# Patient Record
Sex: MALE | Race: WHITE | URBAN - METROPOLITAN AREA
[De-identification: names, ages, dates, MRNs, and addresses within clinical notes are randomized per-mention and may not be internally consistent; named-entity substitution may affect disease eponyms.]

---

## 2018-04-30 ENCOUNTER — TRANSFERRED RECORDS (OUTPATIENT)
Dept: HEALTH INFORMATION MANAGEMENT | Facility: CLINIC | Age: 43
End: 2018-04-30

## 2018-05-14 ENCOUNTER — PRE VISIT (OUTPATIENT)
Dept: ORTHOPEDICS | Facility: CLINIC | Age: 43
End: 2018-05-14

## 2018-05-14 NOTE — TELEPHONE ENCOUNTER
FUTURE VISIT INFORMATION      FUTURE VISIT INFORMATION:    Date: 5/21    Time: 8:10    [place] [facility] [place]  REFERRAL INFORMATION:    Referring provider:  Dr Krishnamurthy    Referring providers clinic:  [facility] [place]    Reason for visit/diagnosis  L rotator cuff tear    RECORDS REQUESTED FROM:       Clinic name Comments Records Status Imaging Status   [facility] [place] Requested records  In PACS                                   RECORDS STATUS

## 2018-05-18 ENCOUNTER — PRE VISIT (OUTPATIENT)
Dept: ORTHOPEDICS | Facility: CLINIC | Age: 43
End: 2018-05-18

## 2018-05-18 DIAGNOSIS — M25.512 LEFT SHOULDER PAIN: Primary | ICD-10-CM

## 2018-05-18 NOTE — TELEPHONE ENCOUNTER
Patient is being referred by Dr. Krishnamurthy for L rotator cuff tear.    Patient is new to this provider.  Patient has no outside records available at the time of chart prep. MRI from 04/30/18 from [facility] in PACS.    Patient is coming to clinic for initial consultation.      Per standing orders, Left shoulder [place] views have been ordered and scheduled.     Patient visit type and questionnaires have been reviewed and are correct for this appointment? Yes    Tiago MURPHY, CMA

## 2018-05-21 ENCOUNTER — RADIANT APPOINTMENT (OUTPATIENT)
Dept: GENERAL RADIOLOGY | Facility: CLINIC | Age: 43
End: 2018-05-21
Attending: ORTHOPAEDIC SURGERY
Payer: COMMERCIAL

## 2018-05-21 ENCOUNTER — OFFICE VISIT (OUTPATIENT)
Dept: ORTHOPEDICS | Facility: CLINIC | Age: 43
End: 2018-05-21
Payer: COMMERCIAL

## 2018-05-21 VITALS — HEIGHT: 68 IN | BODY MASS INDEX: 26.83 KG/M2 | WEIGHT: 177 LBS

## 2018-05-21 DIAGNOSIS — S46.012A TRAUMATIC ROTATOR CUFF TEAR, LEFT, INITIAL ENCOUNTER: Primary | ICD-10-CM

## 2018-05-21 DIAGNOSIS — M25.512 LEFT SHOULDER PAIN: ICD-10-CM
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
## 2018-05-21 NOTE — LETTER
Date:May 24, 2018      Patient was self referred, no letter generated. Do not send.        [facility] Health Information

## 2018-05-21 NOTE — MR AVS SNAPSHOT
"              After Visit Summary   2018    Phu Olson    MRN: [id number]           Patient Information     Date Of Birth          1975        Visit Information        Provider Department      2018 8:10 AM Kole Darby MD [facility]        Today's Diagnoses     Left shoulder pain    -  1       Follow-ups after your visit        Future tests that were ordered for you today     Open Future Orders        Priority Expected Expires Ordered    MR Shoulder Left w/o & w Contrast Routine 2018            Who to contact     Please call your clinic at [phone] to:    Ask questions about your health    Make or cancel appointments    Discuss your medicines    Learn about your test results    Speak to your doctor            Additional Information About Your Visit        MyChart Information     [employer] is an electronic gateway that provides easy, online access to your medical records. With [employer], you can request a clinic appointment, read your test results, renew a prescription or communicate with your care team.     To sign up for [employer] visit the website at [email or web address]   You will be asked to enter the access code listed below, as well as some personal information. Please follow the directions to create your username and password.     Your access code is: [email or web address]  Expires: 2018  6:30 AM     Your access code will  in 90 days. If you need help or a new code, please contact your [facility] or call [phone] for assistance.        Care EveryWhere ID     This is your Care EveryWhere ID. This could be used by other organizations to access your [place] medical records  [id number]        Your Vitals Were     Height BMI (Body Mass Index)                1.727 m (5' 8\") 26.91 kg/m2           Blood Pressure from Last 3 Encounters:   No data found for BP    Weight from Last 3 Encounters: "   05/21/18 80.3 kg (177 lb)               Primary Care Provider Fax #    Physician No Ref-Primary [phone]       No address on file        Equal Access to Services     DAVINA ROCKWELL : Hadii aad ku hadramónwilberto Annymatali, elsy lizbethmaude, pedro borrego, sintia dial robinjayme webster lamarcoschristopher curran. So [facility] [phone].    ATENCIÓN: Si habla español, tiene a cuba disposición servicios gratuitos de asistencia lingüística. Llame al [phone].    We comply with applicable federal civil rights laws and [place] laws. We do not discriminate on the basis of race, color, national origin, age, disability, sex, sexual orientation, or gender identity.            Thank you!     Thank you for choosing [facility]  for your care. Our goal is always to provide you with excellent care. Hearing back from our patients is one way we can continue to improve our services. Please take a few minutes to complete the written survey that you may receive in the mail after your visit with us. Thank you!             Your Updated Medication List - Protect others around you: Learn how to safely use, store and throw away your medicines at www.disposemymeds.org.          This list is accurate as of 5/21/18  9:43 AM.  Always use your most recent med list.                   Brand Name Dispense Instructions for use Diagnosis    FISH OIL PO           MULTI VITAMIN PO
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
## 2018-10-24 ENCOUNTER — TELEPHONE (OUTPATIENT)
Dept: ORTHOPEDICS | Facility: CLINIC | Age: 43
End: 2018-10-24

## 2018-10-24 DIAGNOSIS — Z53.9 ERRONEOUS ENCOUNTER--DISREGARD: Primary | ICD-10-CM

## 2018-11-07 ENCOUNTER — TELEPHONE (OUTPATIENT)
Dept: ORTHOPEDICS | Facility: CLINIC | Age: 43
End: 2018-11-07

## 2018-11-07 NOTE — TELEPHONE ENCOUNTER
Patient requests shoulder surgery as soon as possible.  He is on short term disability while recovering from surgery on his bilateral hand and wrist fractures 2 weeks ago.  The injuries occurred when he fell off a ladder while building a zipline for his daughter.  He plans to transition to removable splints by the beginning of December.    Dr Darby will be consulted next week concerning safe timing of the rotator cuff repair.  Patient is agreeable with this.
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
## 2018-11-15 ENCOUNTER — RADIANT APPOINTMENT (OUTPATIENT)
Dept: MRI IMAGING | Facility: CLINIC | Age: 43
End: 2018-11-15
Attending: ORTHOPAEDIC SURGERY
Payer: COMMERCIAL

## 2018-11-15 ENCOUNTER — TELEPHONE (OUTPATIENT)
Dept: ORTHOPEDICS | Facility: CLINIC | Age: 43
End: 2018-11-15

## 2018-11-15 DIAGNOSIS — M75.102 TEAR OF LEFT ROTATOR CUFF, UNSPECIFIED TEAR EXTENT: ICD-10-CM

## 2018-12-03 ENCOUNTER — OFFICE VISIT (OUTPATIENT)
Dept: ORTHOPEDICS | Facility: CLINIC | Age: 43
End: 2018-12-03
Payer: COMMERCIAL

## 2018-12-03 VITALS — HEIGHT: 68 IN | BODY MASS INDEX: 26.83 KG/M2 | WEIGHT: 177 LBS

## 2018-12-03 DIAGNOSIS — M75.122 COMPLETE TEAR OF LEFT ROTATOR CUFF: Primary | ICD-10-CM

## 2018-12-03 DIAGNOSIS — M75.22 BICEPS TENDINITIS OF LEFT UPPER EXTREMITY: ICD-10-CM

## 2018-12-03 NOTE — MR AVS SNAPSHOT
After Visit Summary   12/3/2018    Phu Olson    MRN: [id number]           Patient Information     Date Of Birth          1975        Visit Information        Provider Department      12/3/2018 1:20 PM Kole Darby MD [facility]        Today's Diagnoses     Complete tear of left rotator cuff    -  1    Biceps tendinitis of left upper extremity           Follow-ups after your visit        Follow-up notes from your care team     Return if symptoms worsen or fail to improve.      Your next 10 appointments already scheduled     Dec 19, 2018   Procedure with Kole Darby MD   [facility], [place], Same Day Surgery (--)    [address] [address] [id number] [phone]              Who to contact     Please call your clinic at [phone] to:    Ask questions about your health    Make or cancel appointments    Discuss your medicines    Learn about your test results    Speak to your doctor            Additional Information About Your Visit        MyChart Information     [employer] gives you secure access to your electronic health record. If you see a primary care provider, you can also send messages to your care team and make appointments. If you have questions, please call your primary care clinic.  If you do not have a primary care provider, please call [phone] and they will assist you.      [employer] is an electronic gateway that provides easy, online access to your medical records. With [employer], you can request a clinic appointment, read your test results, renew a prescription or communicate with your care team.     To access your existing account, please contact your [facility] or call [phone] for assistance.        Care EveryWhere ID     This is your Care EveryWhere ID. This could be used by other organizations to access your [place] medical records  [id number]        Your Vitals Were     Height BMI (Body Mass  "Index)                1.727 m (5' 8\") 26.91 kg/m2           Blood Pressure from Last 3 Encounters:   No data found for BP    Weight from Last 3 Encounters:   12/03/18 80.3 kg (177 lb)   05/21/18 80.3 kg (177 lb)              We Performed the Following     Deana-Operative Worksheet (Shoulder)        Primary Care Provider Fax #    Physician No Ref-Primary [phone]       No address on file        Equal Access to Services     TIP ROCKWELL : Hadii aad ku hadasho Soomaali, waaxda luqadaha, qaybta kaalmada adeegyada, waxay idiin hayaan adeeg shengadalimichael key . So [facility] [phone].    ATENCIÓN: Si habla espjohn, tiene a cuba disposición servicios gratuitos de asistencia lingüística. Llame al [phone].    We comply with applicable federal civil rights laws and [place] laws. We do not discriminate on the basis of race, color, national origin, age, disability, sex, sexual orientation, or gender identity.            Thank you!     Thank you for [facility]  for your care. Our goal is always to provide you with excellent care. Hearing back from our patients is one way we can continue to improve our services. Please take a few minutes to complete the written survey that you may receive in the mail after your visit with us. Thank you!             Your Updated Medication List - Protect others around you: Learn how to safely use, store and throw away your medicines at www.disposemymeds.org.          This list is accurate as of 12/3/18 11:59 PM.  Always use your most recent med list.                   Brand Name Dispense Instructions for use Diagnosis    FISH OIL PO           MULTI VITAMIN PO             "

## 2018-12-03 NOTE — NURSING NOTE
Teaching Flowsheet   Relevant Diagnosis: Rotator cuff tear  Teaching Topic: Pre-op for left ARCR, subacromial decompression, biceps tenodesis, distal clavicle excision - surgery coordinator will call to confirm 12/19/18 OR date at [facility]     Person(s) involved in teaching:   Patient     Motivation Level:  Asks Questions: Yes  Eager to Learn: Yes  Cooperative: Yes  Receptive (willing/able to accept information): Yes  Any cultural factors/Mu-ism beliefs that may influence understanding or compliance? No     Patient demonstrates understanding of the following:  Reason for the appointment, diagnosis and treatment plan: Yes  Knowledge of proper use of medications and conditions for which they are ordered (with special attention to potential side effects or drug interactions): Yes  Which situations necessitate calling provider and whom to contact: Yes     Teaching Concerns Addressed:   Pre-op H&P with primary provider  His mother will provide transportation after surgery  Aware of post-op expectations     Proper use and care of sling x 8 weeks (medical equip, care aids, etc.): Yes  Nutritional needs and diet plan: Yes  Pain management techniques: Yes  Wound Care: Yes  How and/when to access community resources: Yes     Instructional Materials Used/Given: Pre-op packet, surgical soap, written guidelines for care after rotator cuff repair     Time spent with patient: 12.

## 2018-12-03 NOTE — LETTER
Date:December 7, 2018      Patient was self referred, no letter generated. Do not send.        [facility] Information

## 2018-12-03 NOTE — LETTER
12/3/2018       RE: Phu Olson  [address] [address]     Dear Colleague,    Thank you for referring your patient, Phu Olson, to the [facility] at [facility]. Please see a copy of my visit note below.    CHIEF CONCERN: Left shoulder pain    HISTORY:   3-year-old right-hand-dominant male  returns to clinic for repeated evaluation of left shoulder pain.  He was last seen in our clinic in May 21 of 2018 where he was noted to have a rotator cuff tear and proceed with nonoperative management.  In the interim since his last visit, he is undergone physical therapy as well as a repeated MRI which demonstrated progression of his tear.  Therefore, surgical intervention was discussed.  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  He states since then, he underwent open reduction internal fixation of the bilateral wrists by Helene Gauger approximately 5 weeks ago, and has been healing nicely since then.  He is noted worsening of his diffuse left shoulder ache since the fall, and wonders whether there is a difference between his MRI in April and that in November.  He describes a 1/10 ache diffusely throughout his left shoulder, mostly localized anterolaterally but spreads diffusely throughout.  He is uncertain whether or not this is similar pain to prior to the fall.  Otherwise, he has no concerns.  He states he would like to undergo surgery fairly quickly as he is almost within 1 year of the work related incident that caused his left shoulder pain initially.  denies new numbness/tingling/weakness, denies fevers, chills, sob, cp.      PAST SURGICAL HISTORY: (Reviewed with the patient and in the medical record)  1. Right RCR   2. Revision right RCR  3. Heart ablation for WPW - resolved  4. Right hand surgery    EXAM:     General: Pleasant,  well-developed, male   Affect: Articulates and communicates with a normal and congruent affect  Pulm: Non-labored breathing at rest  Vascular: Left hand is warm and well-perfused  Eyes: Extra-ocular movements are intact   Lymphatics: No swelling or palpable lymphadenopathy in the shoulder and arm region.  Neuro: Sensation is grossly intact to light touch in the Ax/MCN/M/R/U nerve distributions.  Integumentary: Normal hair and sweat patterns without evidence of skin breakdown         Left upper extremity:    SANE score: 85    Skin is intact without lacerations, abrasions, or previous surgical incision    Tender over bceps, otherwise, Nontender to palpation over the AC joint, subacromial space, posterior shoulder, or scapular spine.    Range of Motion:     Forward Flexion: 170  of active forward flexion    External Rotation: 50  of active external rotation without leg    Internal Rotation: Internal rotation to L1 with intact liftoff    Negative speed and Yergason's provocative signs    Mild tenderness to palpation over the bicipital groove    Positive Hamilton's maneuver for anterior shoulder pain which improves with pronation    Minimal discomfort with resisted forward flexion in the scapular plane as well as resisted external rotation in adduction      IMAGING:    Left shoulder radiographs obtained in clinic today demonstrate no acute osseous abnormality or evidence of glenohumeral degenerative joint disease.    Left shoulder MRI obtained November 15 is compared to that from April 30, does demonstrate tearing, high-grade, the supraspinatus at the margin of the supra and infraspinatus, articular sided, also partial tearing of the subscapularis, there is biceps tendinopathy with flattening of the biceps tendon, increased signal as well as increased fluid surrounding, advanced AC joint arthrosis, no areas of bony edema    ASSESSMENT:  1. Full-thickness supraspinatus rotator cuff tear  2. 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  At present, his pain is worsening despite activity avoidance of nonsteroidal anti-inflammatories.  His repeat MRI does show a full-thickness tear of his supraspinatus as well as biceps tendinitis.  Therefore, as he has not had improvement with nonoperative management, we discussed operative management in the way of arthroscopic rotator cuff repair, subacromial decompression, distal clavicle excision, and biceps tenodesis.  We discussed risks benefits and alternatives.  We discussed risks including risk of death, stroke, blindness, permanent neurovascular injury, infection, continued pain, need for repeat surgery.  We discussed the postoperative course and 6 weeks of sling wear.  The patient was given opportunity to ask questions and wished to proceed.   Otherwise, he can continue activity and weightbearing as per Dr. Crandall restrictions given his recent bilateral open reduction internal fixation of the distal radius, and follow-up on an as-needed basis prior to surgery.      Isaiah Dietz MD MS  Orthopedic Surgery, PGY5    Seen and discussed with Dr. Darby who agrees      I saw the patient with the resident.  I agree with the resident note and plan of care.      Kole Darby MD      Again, thank you for allowing me to participate in the care of your patient.      Sincerely,    Kole Darby MD

## 2018-12-03 NOTE — PROGRESS NOTES
CHIEF CONCERN: Left shoulder pain    HISTORY:   3-year-old right-hand-dominant male  returns to clinic for repeated evaluation of left shoulder pain.  He was last seen in our clinic in May 21 of 2018 where he was noted to have a rotator cuff tear and proceed with nonoperative management.  In the interim since his last visit, he is undergone physical therapy as well as a repeated MRI which demonstrated progression of his tear.  Therefore, surgical intervention was discussed.  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  He states since then, he underwent open reduction internal fixation of the bilateral wrists by Helene Zhu approximately 5 weeks ago, and has been healing nicely since then.  He is noted worsening of his diffuse left shoulder ache since the fall, and wonders whether there is a difference between his MRI in April and that in November.  He describes a 1/10 ache diffusely throughout his left shoulder, mostly localized anterolaterally but spreads diffusely throughout.  He is uncertain whether or not this is similar pain to prior to the fall.  Otherwise, he has no concerns.  He states he would like to undergo surgery fairly quickly as he is almost within 1 year of the work related incident that caused his left shoulder pain initially.  denies new numbness/tingling/weakness, denies fevers, chills, sob, cp.      PAST SURGICAL HISTORY: (Reviewed with the patient and in the medical record)  1. Right RCR   2. Revision right RCR  3. Heart ablation for WPW - resolved  4. Right hand surgery    EXAM:     General: Pleasant, well-developed, male   Affect: Articulates and communicates with a normal and congruent affect  Pulm: Non-labored breathing at rest  Vascular: Left hand is warm and well-perfused  Eyes: Extra-ocular movements are intact   Lymphatics: No swelling or palpable lymphadenopathy in the shoulder  and arm region.  Neuro: Sensation is grossly intact to light touch in the Ax/MCN/M/R/U nerve distributions.  Integumentary: Normal hair and sweat patterns without evidence of skin breakdown         Left upper extremity:    SANE score: 85    Skin is intact without lacerations, abrasions, or previous surgical incision    Tender over bceps, otherwise, Nontender to palpation over the AC joint, subacromial space, posterior shoulder, or scapular spine.    Range of Motion:     Forward Flexion: 170  of active forward flexion    External Rotation: 50  of active external rotation without leg    Internal Rotation: Internal rotation to L1 with intact liftoff    Negative speed and Yergason's provocative signs    Mild tenderness to palpation over the bicipital groove    Positive [place]'s maneuver for anterior shoulder pain which improves with pronation    Minimal discomfort with resisted forward flexion in the scapular plane as well as resisted external rotation in adduction      IMAGING:    Left shoulder radiographs obtained in clinic today demonstrate no acute osseous abnormality or evidence of glenohumeral degenerative joint disease.    Left shoulder MRI obtained November 15 is compared to that from April 30, does demonstrate tearing, high-grade, the supraspinatus at the margin of the supra and infraspinatus, articular sided, also partial tearing of the subscapularis, there is biceps tendinopathy with flattening of the biceps tendon, increased signal as well as increased fluid surrounding, advanced AC joint arthrosis, no areas of bony edema    ASSESSMENT:  1. Full-thickness supraspinatus rotator cuff tear  2. Clinical and radiographic evidence of long head biceps pathology    PLAN:  Had a very nice discussion with this patient today regarding his left shoulder.  At present, his pain is worsening despite activity avoidance of nonsteroidal anti-inflammatories.  His repeat MRI does show a full-thickness tear of his supraspinatus  as well as biceps tendinitis.  Therefore, as he has not had improvement with nonoperative management, we discussed operative management in the way of arthroscopic rotator cuff repair, subacromial decompression, distal clavicle excision, and biceps tenodesis.  We discussed risks benefits and alternatives.  We discussed risks including risk of death, stroke, blindness, permanent neurovascular injury, infection, continued pain, need for repeat surgery.  We discussed the postoperative course and 6 weeks of sling wear.  The patient was given opportunity to ask questions and wished to proceed.   Otherwise, he can continue activity and weightbearing as per Dr. Crandall restrictions given his recent bilateral open reduction internal fixation of the distal radius, and follow-up on an as-needed basis prior to surgery.      Isaiah Dietz MD MS  Orthopedic Surgery, PGY5    Seen and discussed with Dr. Darby who agrees      I saw the patient with the resident.  I agree with the resident note and plan of care.      Kole Darby MD
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
## 2018-12-18 ENCOUNTER — TELEPHONE (OUTPATIENT)
Dept: ORTHOPEDICS | Facility: CLINIC | Age: 43
End: 2018-12-18

## 2018-12-18 ENCOUNTER — ANESTHESIA EVENT (OUTPATIENT)
Dept: SURGERY | Facility: CLINIC | Age: 43
End: 2018-12-18
Payer: COMMERCIAL

## 2018-12-18 ASSESSMENT — LIFESTYLE VARIABLES: TOBACCO_USE: 0

## 2018-12-18 NOTE — TELEPHONE ENCOUNTER
[facility] Call Center    Phone Message    May a detailed message be left on voicemail: yes    Reason for Call: Other: Patient is calling with questions regarding surgery 12/19/2018 with Dr. Darby. Please have nurse call the patient back to discuss. Thank you.     Action Taken: Message routed to:  [facility] ([facility] Ortho

## 2018-12-18 NOTE — TELEPHONE ENCOUNTER
Patient contacted.  I went over the surgery worksheet and clinic note with him.  I could not answer his questions.  I spoke with Renata in surgery scheduling.  She contacted dr. Mehnaz SHAIKH and will ask Dr. Darby to call him.

## 2018-12-19 ENCOUNTER — ANESTHESIA (OUTPATIENT)
Dept: SURGERY | Facility: CLINIC | Age: 43
End: 2018-12-19
Payer: COMMERCIAL

## 2018-12-19 ENCOUNTER — HOSPITAL ENCOUNTER (OUTPATIENT)
Facility: CLINIC | Age: 43
Discharge: HOME | End: 2018-12-19
Attending: ORTHOPAEDIC SURGERY | Admitting: ORTHOPAEDIC SURGERY
Payer: COMMERCIAL

## 2018-12-19 VITALS
HEART RATE: 71 BPM | BODY MASS INDEX: 26.93 KG/M2 | OXYGEN SATURATION: 95 % | WEIGHT: 177.69 LBS | RESPIRATION RATE: 16 BRPM | SYSTOLIC BLOOD PRESSURE: 110 MMHG | DIASTOLIC BLOOD PRESSURE: 60 MMHG | HEIGHT: 68 IN | TEMPERATURE: 98.6 F

## 2018-12-19 DIAGNOSIS — M25.519 CHRONIC SHOULDER PAIN, UNSPECIFIED LATERALITY: Primary | ICD-10-CM

## 2018-12-19 DIAGNOSIS — G89.29 CHRONIC SHOULDER PAIN, UNSPECIFIED LATERALITY: Primary | ICD-10-CM

## 2018-12-19 LAB — GLUCOSE BLDC GLUCOMTR-MCNC: 99 MG/DL (ref 70–99)

## 2018-12-19 PROCEDURE — 71000014 ZZH RECOVERY PHASE 1 LEVEL 2 FIRST HR: Performed by: ORTHOPAEDIC SURGERY

## 2018-12-19 PROCEDURE — 25000128 H RX IP 250 OP 636: Performed by: NURSE ANESTHETIST, CERTIFIED REGISTERED

## 2018-12-19 PROCEDURE — C9290 INJ, BUPIVACAINE LIPOSOME: HCPCS | Performed by: ANESTHESIOLOGY

## 2018-12-19 PROCEDURE — 37000009 ZZH ANESTHESIA TECHNICAL FEE, EACH ADDTL 15 MIN: Performed by: ORTHOPAEDIC SURGERY

## 2018-12-19 PROCEDURE — 36000057 ZZH SURGERY LEVEL 3 1ST 30 MIN - UMMC: Performed by: ORTHOPAEDIC SURGERY

## 2018-12-19 PROCEDURE — 25000128 H RX IP 250 OP 636: Performed by: ORTHOPAEDIC SURGERY

## 2018-12-19 PROCEDURE — 25000125 ZZHC RX 250: Performed by: ORTHOPAEDIC SURGERY

## 2018-12-19 PROCEDURE — 25000128 H RX IP 250 OP 636: Performed by: ANESTHESIOLOGY

## 2018-12-19 PROCEDURE — 27110028 ZZH OR GENERAL SUPPLY NON-STERILE: Performed by: ORTHOPAEDIC SURGERY

## 2018-12-19 PROCEDURE — 36000059 ZZH SURGERY LEVEL 3 EA 15 ADDTL MIN UMMC: Performed by: ORTHOPAEDIC SURGERY

## 2018-12-19 PROCEDURE — 82962 GLUCOSE BLOOD TEST: CPT

## 2018-12-19 PROCEDURE — C1713 ANCHOR/SCREW BN/BN,TIS/BN: HCPCS | Performed by: ORTHOPAEDIC SURGERY

## 2018-12-19 PROCEDURE — 25000132 ZZH RX MED GY IP 250 OP 250 PS 637: Performed by: ANESTHESIOLOGY

## 2018-12-19 PROCEDURE — 40000170 ZZH STATISTIC PRE-PROCEDURE ASSESSMENT II: Performed by: ORTHOPAEDIC SURGERY

## 2018-12-19 PROCEDURE — 71000027 ZZH RECOVERY PHASE 2 EACH 15 MINS: Performed by: ORTHOPAEDIC SURGERY

## 2018-12-19 PROCEDURE — 37000008 ZZH ANESTHESIA TECHNICAL FEE, 1ST 30 MIN: Performed by: ORTHOPAEDIC SURGERY

## 2018-12-19 PROCEDURE — 25000566 ZZH SEVOFLURANE, EA 15 MIN: Performed by: ORTHOPAEDIC SURGERY

## 2018-12-19 PROCEDURE — 27210794 ZZH OR GENERAL SUPPLY STERILE: Performed by: ORTHOPAEDIC SURGERY

## 2018-12-19 PROCEDURE — 25000125 ZZHC RX 250: Performed by: NURSE ANESTHETIST, CERTIFIED REGISTERED

## 2018-12-19 PROCEDURE — 71000015 ZZH RECOVERY PHASE 1 LEVEL 2 EA ADDTL HR: Performed by: ORTHOPAEDIC SURGERY

## 2018-12-19 DEVICE — IMP ANCHOR ARTHREX 5.5MM BIOCOMP CRKSCR TIGRTL AR-1927BCFT: Type: IMPLANTABLE DEVICE | Site: SHOULDER | Status: FUNCTIONAL

## 2018-12-19 DEVICE — IMP ANCHOR ARTHREX BC SWVLK TENODESIS 6.25X19.1MM AR-1662BC: Type: IMPLANTABLE DEVICE | Site: SHOULDER | Status: FUNCTIONAL

## 2018-12-19 RX ORDER — SODIUM CHLORIDE, SODIUM LACTATE, POTASSIUM CHLORIDE, CALCIUM CHLORIDE 600; 310; 30; 20 MG/100ML; MG/100ML; MG/100ML; MG/100ML
INJECTION, SOLUTION INTRAVENOUS CONTINUOUS
Status: DISCONTINUED | OUTPATIENT
Start: 2018-12-19 | End: 2018-12-19 | Stop reason: HOSPADM

## 2018-12-19 RX ORDER — ONDANSETRON 4 MG/1
4 TABLET, ORALLY DISINTEGRATING ORAL EVERY 30 MIN PRN
Status: DISCONTINUED | OUTPATIENT
Start: 2018-12-19 | End: 2018-12-19 | Stop reason: HOSPADM

## 2018-12-19 RX ORDER — PROPOFOL 10 MG/ML
INJECTION, EMULSION INTRAVENOUS CONTINUOUS PRN
Status: DISCONTINUED | OUTPATIENT
Start: 2018-12-19 | End: 2018-12-19

## 2018-12-19 RX ORDER — HYDROXYZINE PAMOATE 25 MG/1
25 CAPSULE ORAL 3 TIMES DAILY PRN
Qty: 30 CAPSULE | Refills: 0 | Status: SHIPPED | OUTPATIENT
Start: 2018-12-19 | End: 2019-01-18

## 2018-12-19 RX ORDER — FENTANYL CITRATE 50 UG/ML
25-50 INJECTION, SOLUTION INTRAMUSCULAR; INTRAVENOUS
Status: DISCONTINUED | OUTPATIENT
Start: 2018-12-19 | End: 2018-12-19 | Stop reason: HOSPADM

## 2018-12-19 RX ORDER — KETAMINE HYDROCHLORIDE 10 MG/ML
INJECTION, SOLUTION INTRAMUSCULAR; INTRAVENOUS PRN
Status: DISCONTINUED | OUTPATIENT
Start: 2018-12-19 | End: 2018-12-19

## 2018-12-19 RX ORDER — CEFAZOLIN SODIUM 2 G/100ML
2 INJECTION, SOLUTION INTRAVENOUS
Status: COMPLETED | OUTPATIENT
Start: 2018-12-19 | End: 2018-12-19

## 2018-12-19 RX ORDER — GABAPENTIN 300 MG/1
300 CAPSULE ORAL 3 TIMES DAILY
Qty: 90 CAPSULE | Refills: 0 | Status: SHIPPED | OUTPATIENT
Start: 2018-12-19 | End: 2019-01-18

## 2018-12-19 RX ORDER — CEFAZOLIN SODIUM 1 G/3ML
1 INJECTION, POWDER, FOR SOLUTION INTRAMUSCULAR; INTRAVENOUS SEE ADMIN INSTRUCTIONS
Status: DISCONTINUED | OUTPATIENT
Start: 2018-12-19 | End: 2018-12-19 | Stop reason: HOSPADM

## 2018-12-19 RX ORDER — PROPOFOL 10 MG/ML
INJECTION, EMULSION INTRAVENOUS PRN
Status: DISCONTINUED | OUTPATIENT
Start: 2018-12-19 | End: 2018-12-19

## 2018-12-19 RX ORDER — FENTANYL CITRATE 50 UG/ML
100 INJECTION, SOLUTION INTRAMUSCULAR; INTRAVENOUS ONCE
Status: DISCONTINUED | OUTPATIENT
Start: 2018-12-19 | End: 2018-12-19 | Stop reason: HOSPADM

## 2018-12-19 RX ORDER — NALOXONE HYDROCHLORIDE 0.4 MG/ML
.1-.4 INJECTION, SOLUTION INTRAMUSCULAR; INTRAVENOUS; SUBCUTANEOUS
Status: DISCONTINUED | OUTPATIENT
Start: 2018-12-19 | End: 2018-12-19 | Stop reason: HOSPADM

## 2018-12-19 RX ORDER — IBUPROFEN 800 MG/1
800 TABLET, FILM COATED ORAL
Qty: 90 TABLET | Refills: 0 | Status: SHIPPED | OUTPATIENT
Start: 2018-12-19 | End: 2019-01-18

## 2018-12-19 RX ORDER — HYDROMORPHONE HYDROCHLORIDE 1 MG/ML
.3-.5 INJECTION, SOLUTION INTRAMUSCULAR; INTRAVENOUS; SUBCUTANEOUS EVERY 10 MIN PRN
Status: DISCONTINUED | OUTPATIENT
Start: 2018-12-19 | End: 2018-12-19 | Stop reason: HOSPADM

## 2018-12-19 RX ORDER — ONDANSETRON 2 MG/ML
INJECTION INTRAMUSCULAR; INTRAVENOUS PRN
Status: DISCONTINUED | OUTPATIENT
Start: 2018-12-19 | End: 2018-12-19

## 2018-12-19 RX ORDER — ONDANSETRON 2 MG/ML
4 INJECTION INTRAMUSCULAR; INTRAVENOUS EVERY 30 MIN PRN
Status: DISCONTINUED | OUTPATIENT
Start: 2018-12-19 | End: 2018-12-19 | Stop reason: HOSPADM

## 2018-12-19 RX ORDER — GABAPENTIN 100 MG/1
300 CAPSULE ORAL
Status: DISCONTINUED | OUTPATIENT
Start: 2018-12-19 | End: 2018-12-19 | Stop reason: HOSPADM

## 2018-12-19 RX ORDER — OXYCODONE HYDROCHLORIDE 5 MG/1
5 TABLET ORAL EVERY 6 HOURS PRN
Qty: 40 TABLET | Refills: 0 | Status: SHIPPED | OUTPATIENT
Start: 2018-12-19 | End: 2019-01-18

## 2018-12-19 RX ORDER — SODIUM CHLORIDE, SODIUM LACTATE, POTASSIUM CHLORIDE, CALCIUM CHLORIDE 600; 310; 30; 20 MG/100ML; MG/100ML; MG/100ML; MG/100ML
INJECTION, SOLUTION INTRAVENOUS CONTINUOUS PRN
Status: DISCONTINUED | OUTPATIENT
Start: 2018-12-19 | End: 2018-12-19

## 2018-12-19 RX ORDER — FENTANYL CITRATE 50 UG/ML
INJECTION, SOLUTION INTRAMUSCULAR; INTRAVENOUS PRN
Status: DISCONTINUED | OUTPATIENT
Start: 2018-12-19 | End: 2018-12-19

## 2018-12-19 RX ORDER — ACETAMINOPHEN 325 MG/1
975 TABLET ORAL ONCE
Status: COMPLETED | OUTPATIENT
Start: 2018-12-19 | End: 2018-12-19

## 2018-12-19 RX ORDER — DEXAMETHASONE SODIUM PHOSPHATE 4 MG/ML
INJECTION, SOLUTION INTRA-ARTICULAR; INTRALESIONAL; INTRAMUSCULAR; INTRAVENOUS; SOFT TISSUE PRN
Status: DISCONTINUED | OUTPATIENT
Start: 2018-12-19 | End: 2018-12-19

## 2018-12-19 RX ORDER — BUPIVACAINE HYDROCHLORIDE 5 MG/ML
INJECTION, SOLUTION EPIDURAL; INTRACAUDAL PRN
Status: DISCONTINUED | OUTPATIENT
Start: 2018-12-19 | End: 2018-12-19

## 2018-12-19 RX ORDER — GABAPENTIN 100 MG/1
300 CAPSULE ORAL ONCE
Status: DISCONTINUED | OUTPATIENT
Start: 2018-12-19 | End: 2018-12-19 | Stop reason: HOSPADM

## 2018-12-19 RX ORDER — ACETAMINOPHEN 325 MG/1
975 TABLET ORAL ONCE
Status: DISCONTINUED | OUTPATIENT
Start: 2018-12-19 | End: 2018-12-19 | Stop reason: HOSPADM

## 2018-12-19 RX ORDER — FLUMAZENIL 0.1 MG/ML
0.2 INJECTION, SOLUTION INTRAVENOUS
Status: DISCONTINUED | OUTPATIENT
Start: 2018-12-19 | End: 2018-12-19 | Stop reason: HOSPADM

## 2018-12-19 RX ADMIN — FENTANYL CITRATE 50 MCG: 50 INJECTION, SOLUTION INTRAMUSCULAR; INTRAVENOUS at 10:45

## 2018-12-19 RX ADMIN — ONDANSETRON 4 MG: 2 INJECTION INTRAMUSCULAR; INTRAVENOUS at 11:09

## 2018-12-19 RX ADMIN — BUPIVACAINE 10 ML: 13.3 INJECTION, SUSPENSION, LIPOSOMAL INFILTRATION at 08:50

## 2018-12-19 RX ADMIN — MIDAZOLAM HYDROCHLORIDE 2 MG: 1 INJECTION, SOLUTION INTRAMUSCULAR; INTRAVENOUS at 07:56

## 2018-12-19 RX ADMIN — SODIUM CHLORIDE, POTASSIUM CHLORIDE, SODIUM LACTATE AND CALCIUM CHLORIDE: 600; 310; 30; 20 INJECTION, SOLUTION INTRAVENOUS at 09:16

## 2018-12-19 RX ADMIN — CEFAZOLIN SODIUM 2 G: 2 INJECTION, SOLUTION INTRAVENOUS at 09:37

## 2018-12-19 RX ADMIN — PROPOFOL 175 MCG/KG/MIN: 10 INJECTION, EMULSION INTRAVENOUS at 09:28

## 2018-12-19 RX ADMIN — ACETAMINOPHEN 975 MG: 325 TABLET, FILM COATED ORAL at 07:50

## 2018-12-19 RX ADMIN — PHENYLEPHRINE HYDROCHLORIDE 0.3 MCG/KG/MIN: 10 INJECTION, SOLUTION INTRAMUSCULAR; INTRAVENOUS; SUBCUTANEOUS at 09:37

## 2018-12-19 RX ADMIN — PROPOFOL 200 MG: 10 INJECTION, EMULSION INTRAVENOUS at 09:20

## 2018-12-19 RX ADMIN — KETAMINE HYDROCHLORIDE 20 MG: 10 INJECTION, SOLUTION INTRAMUSCULAR; INTRAVENOUS at 09:20

## 2018-12-19 RX ADMIN — BUPIVACAINE HYDROCHLORIDE 10 ML: 5 INJECTION, SOLUTION EPIDURAL; INTRACAUDAL at 08:50

## 2018-12-19 RX ADMIN — DEXAMETHASONE SODIUM PHOSPHATE 10 MG: 4 INJECTION, SOLUTION INTRAMUSCULAR; INTRAVENOUS at 09:24

## 2018-12-19 ASSESSMENT — MIFFLIN-ST. JEOR: SCORE: 1675.37

## 2018-12-19 NOTE — BRIEF OP NOTE
[facility], [place]    Brief Operative Note    Pre-operative diagnosis: Rotator Cuff Tear   Post-operative diagnosis * No post-op diagnosis entered *  Procedure: Procedure(s):  Left Shoulder Arthroscopic Rotator Cuff Repair, Arthroscopic Subacromial Decompression  Arthroscopic Biceps Tenodesis Repair, Arthroscopic Distal Clavical Excision  Surgeon: Surgeon(s) and Role:     * Kole Darby MD - Primary     * Dano Narayanan PA-C - Assisting     * Mariam Sarah MD - Resident - Assisting  Anesthesia: Other   Estimated blood loss: Minimal  Drains: None  Specimens: * No specimens in log *  Findings:   None.  Complications: None.  Implants: None.

## 2018-12-19 NOTE — ANESTHESIA POSTPROCEDURE EVALUATION
Anesthesia POST Procedure Evaluation    Patient: Phu Olson   MRN:     [id number] Gender:   male   Age:    43 year old :      1975        Preoperative Diagnosis: Rotator Cuff Tear    Procedure(s):  Left Shoulder Arthroscopic Rotator Cuff Repair, Arthroscopic Subacromial Decompression  Arthroscopic Biceps Tenodesis Repair, Arthroscopic Distal Clavical Excision   Postop Comments: No value filed.       Anesthesia Type:  General    Reportable Event: NO     PAIN: Uncomplicated   Sign Out status: Comfortable, Well controlled pain     PONV: No PONV   Sign Out status:  No Nausea or Vomiting     Neuro/Psych: Uneventful perioperative course   Sign Out Status: Preoperative baseline; Age appropriate mentation     Airway/Resp.: Uneventful perioperative course   Sign Out Status: Airway Device present     CV: Uneventful perioperative course   Sign Out status: Appropriate BP and perfusion indices; Appropriate HR/Rhythm     Disposition:   Sign Out in:  PACU  Disposition:  Phase II; Home  Recovery Course: Uneventful  Follow-Up: Not required           Last Anesthesia Record Vitals:  CRNA VITALS  2018 1012 - 2018 1042      2018             Pulse:  80    Ht Rate:  79    Temp:  35.1  C (95.2  F)    SpO2:  99 %    Resp Rate (observed):  15          Last PACU/Preop Vitals:  Vitals:    18 1136 18 1145 18 1200   BP: 115/66 121/69 112/57   Pulse: 73 69 69   Resp: 23 13 10   Temp: 36.5  C (97.7  F)  37  C (98.6  F)   SpO2: 100% 97% 97%         Electronically Signed By: Jamin Huang MD, 2018, 12:30 AM

## 2018-12-19 NOTE — ANESTHESIA CARE TRANSFER NOTE
Patient: Phu Olson    Procedure(s):  Left Shoulder Arthroscopic Rotator Cuff Repair, Arthroscopic Subacromial Decompression  Arthroscopic Biceps Tenodesis Repair, Arthroscopic Distal Clavical Excision    Diagnosis: Rotator Cuff Tear   Diagnosis Additional Information: No value filed.    Anesthesia Type:   General, LMA, Peripheral Nerve Block for post-op pain at surgeon's request     Note:  Airway :Face Mask  Patient transferred to:PACU  Comments: Spont respirations, no s/s resp distress. VSS. Opens eyes to commands. Extubated to FMO2, VS remain stable. Report to RN. Handoff Report: Identifed the Patient, Identified the Reponsible Provider, Reviewed the pertinent medical history, Discussed the surgical course, Reviewed Intra-OP anesthesia mangement and issues during anesthesia, Set expectations for post-procedure period and Allowed opportunity for questions and acknowledgement of understanding      Vitals: (Last set prior to Anesthesia Care Transfer)    CRNA VITALS  12/19/2018 1101 - 12/19/2018 1139      12/19/2018             Resp Rate (observed):  12    EKG:  NSR                Electronically Signed By: LUISA Ramírez CRNA  December 19, 2018  11:39 AM

## 2018-12-19 NOTE — ADDENDUM NOTE
Addendum  created 12/19/18 1235 by Loli Bowens MD    Intraprocedure Blocks edited, Sign clinical note

## 2018-12-19 NOTE — DISCHARGE INSTRUCTIONS
Same-Day Surgery   Adult Discharge Orders & Instructions     For 24 hours after surgery:  1. Get plenty of rest.  A responsible adult must stay with you for at least 24 hours after you leave the hospital.   2. Pain medication can slow your reflexes. Do not drive or use heavy equipment.  If you have weakness or tingling, don't drive or use heavy equipment until this feeling goes away.  3. Mixing alcohol and pain medication can cause dizziness and slow your breathing. It can even be fatal. Do not drink alcohol while taking pain medication.  4. Avoid strenuous or risky activities.  Ask for help when climbing stairs.   5. You may feel lightheaded.  If so, sit for a few minutes before standing.  Have someone help you get up.   6. If you have nausea (feel sick to your stomach), drink only clear liquids such as apple juice, ginger ale, broth or 7-Up.  Rest may also help.  Be sure to drink enough fluids.  Move to a regular diet as you feel able. Take pain medications with a small amount of solid food, such as toast or crackers, to avoid nausea.   7. A slight fever is normal. Call the doctor if your fever is over 100 F (37.7 C) (taken under the tongue) or lasts longer than 24 hours.  8. You may have a dry mouth, muscle aches, trouble sleeping or a sore throat.  These symptoms should go away after 24 hours.  9. Do not make important or legal decisions.   Pain Management:      1. Take pain medication (if prescribed) for pain as directed by your physician.        2. WARNING: If the pain medication you have been prescribed contains Tylenol  (acetaminophen), DO NOT take additional doses of Tylenol (acetaminophen).     Call your doctor for any of the followin.  Signs of infection (fever, growing tenderness at the surgery site, severe pain, a large amount of drainage or bleeding, foul-smelling drainage, redness, swelling).    2.  It has been over 8 to 10 hours since surgery and you are still not able to urinate (pee).    3.   Headache for over 24 hours.    4.  Numbness, tingling or weakness the day after surgery (if you had spinal anesthesia).  To contact a doctor, call _____________________________________ or:      [phone] and ask for the Resident On Call for:          __________________________________________ (answered 24 hours a day)      Emergency Department:  [place] [facility]: [phone]  [place] [facility]: [phone]               Rev. 10/2014
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
## 2018-12-19 NOTE — ANESTHESIA PROCEDURE NOTES
Peripheral Nerve Block Procedure Note    Staff:     Anesthesiologist:  Saad Keenan MD    Resident/CRNA:  Loli Bowens MD    Block performed by resident/CRNA in the presence of a teaching physician    Location: Pre-op  Procedure Start/Stop TImes:      12/19/2018 8:40 AM    patient identified, IV checked, site marked, risks and benefits discussed, informed consent, monitors and equipment checked, pre-op evaluation, at physician/surgeon's request and post-op pain management      Correct Patient: Yes      Correct Position: Yes      Correct Site: Yes      Correct Procedure: Yes      Correct Laterality:  Yes    Site Marked:  Yes  Procedure details:     Procedure:  Interscalene    ASA:  2    Diagnosis:  Left shoulder pain    Laterality:  Left    Position:  Supine    Needle:  Short bevel    Needle gauge:  21    Ultrasound: Yes      Ultrasound used to identify targeted nerve, plexus, or vascular structure and placed a needle adjacent to it      Permanent Image entered into patiient's record      Abnormal pain on injection: No      Blood Aspirated: No      Paresthesias:  No    Bleeding at site: No      Bolus via:  Needle    Infusion Method:  Single Shot    Complications:  None

## 2018-12-19 NOTE — OR NURSING
Pt requesting to speak with Dr. Darby prior to doing anything in preop. Concerned about procedure and questions about hardware that will be used. Paged Dr Darby, at bedside to speak with patient.

## 2018-12-21 NOTE — OP NOTE
Procedure Date: 12/19/2018      PREOPERATIVE DIAGNOSES:   1.  Left shoulder rotator cuff tear.    2.  Left shoulder biceps disease.   3.  Left shoulder symptomatic acromioclavicular joint arthritis.   4.  Left shoulder subacromial bursitis.      POSTOPERATIVE DIAGNOSES:   1.  Left shoulder high-grade partial-thickness articular-sided rotator cuff tear.   2.  Left shoulder biceps tendon tenosynovitis.   3.  Left shoulder symptomatic acromioclavicular arthritis.   4.  Shoulder subacromial bursitis with bony impingement.      SURGICAL PROCEDURES:   1.  Left shoulder arthroscopic rotator cuff repair.     2.  Left shoulder arthroscopic biceps tenodesis.   3.  Left shoulder arthroscopic distal clavicle excision.   4.  Left shoulder arthroscopic subacromial decompression with bony acromioplasty.      ANESTHESIA:  Interscalene blockade plus sedation.      ESTIMATED BLOOD LOSS:  Less than 25 mL.      IMPLANTS:   1.  Arthrex 5.5 mm BioComposite corkscrew anchor x1.   2.  Arthrex 6.25 mm BioComposite SwiveLock anchor x1 in the biceps.       SURGEON: Kole Darby MD    ASSISTANT: SOFIE Weinberg    LEARNER: Mariam Sarah, Resident.    INDICATIONS:  Mr. Olson is a very pleasant 43-year-old male with history of pain and disability in his shoulder.  He had a preoperative evaluation that was consistent with the above and a trial of nonsurgical management, including activity modification and analgesics.  After discussion of risks and benefits of surgical versus nonsurgical management, he elected to proceed with surgical remediation.      DESCRIPTION OF PROCEDURE:  The patient was positively identified in the preanesthesia care area, surgical site was initialed by me and his consent was reviewed with him.  He was then taken to the operating theater, where he was placed in a well-padded beachchair position, prepped and draped in the usual sterile fashion for left upper extremity surgery.      Prior to surgical  "initiation, a \"time out\" was held in accordance with hospital policy.  Verbal verification of delivery of prophylactic intravenous antibiotics was performed.      After establishment of a posterior viewing portal, an anterolateral portal was made under direct visualization.  Diagnostic arthroscopy was then possible with findings as follows:  The upper portion of the subscapularis was intact.  The pouch was synovitic, but devoid of loose bodies.  Posterior cuff was intact.  Superior cuff was frayed on its undersurface at the midpoint of the supraspinatus.  Biceps was abnormal at its origin and tenosynovitic as it entered the bicipital groove.      The biceps tendon was tagged twice with 0 PDS sutures and amputated off the anterosuperior labrum.  I debrided it back to a stable rim.  I debrided the contents of the rotator interval.  I turned my attention to the subacromial space.      Upon entering the subacromial space, massive and significant bursitis was encountered.  I debrided this with a shaver and an ablator.  I denuded the undersurface of the acromion of all soft tissues, including the CA ligament and used a motorized bur to resect an anterolaterally based wedge of bone from the undersurface of the acromion that tapered posteromedially until type 1 acromial morphology was created.  There was a small ossicle anterior to that was also removed in toto.  I was then able to view from the anterolateral portal to make a low anterolateral portal under direct visualization.  I opened the transverse humeral ligament and brought the biceps tendon out the low anterolateral portal and made a rotating retrograde migratory [place] stitch and then amputated 5 cm of diseased tendon.  I placed the biceps tendon deep into a 6.5 mm hole that had been drilled in the inferior aspect of the bicipital groove and held it with a 6.25 mm BioComposite SwiveLock anchor.  This effected an excellent biceps tenodesis.  The rest of the contents " of the bicipital groove were denuded of all soft tissue using an ablator.      Looking at the rotator cuff tear which I had tagged while I was in the intra-articular space with a PDS suture, I was able to prepare the footprint with an arthroscopic curet and shaver.  The arthroscopic shaver fell easily into this.  As I prepared the footprint, I denuded it back to intact bone and then was able to place a 5.5 mm BioComposite corkscrew anchor tap and placed it in a MAC fashion, tying the horizontal limb, followed by the vertical.  This effected an excellent reapproximation of the entirety of this rotator cuff tear back to its prepared bed.      Viewing from posteriorly, I used an ablator to resect the anterior, inferior, and post-capsular constraints.  I used a motorized bur to resect the lateral aspect of the clavicle until a 1 cm section had been performed.  Verified from orthogonal views that this was indeed the case and that there was no anterosuperior or posterosuperior residual bone.  The superior ligamentous structures were preserved throughout.      All instruments were removed.  Closure was with 3-0 interrupted Monocryl sutures.  Steri-Strips and a sterile nonadherent dressing were applied.  A sling was placed.      POSTOPERATIVE PLAN:    1.  The patient will be discharged home today on oral analgesics.  He should have Codman exercises, but no other active or passive range of motion.     2.  At his first followup visit, he will begin supine gravity eliminated forward elevation 0 to unlimited and passive external rotation at the side 0 to unlimited.   3.  At the 6-week dena, he will begin gentle progressive 4-quadrant stretching.   4.  Total sling time will be 6 weeks.   5.  At 3 months, he will have unrestrictive 4-quadrant stretching, periscapular stabilization and strengthening.  Radiographs will also be obtained at that visit.         DEMETRIUS GUERRERO MD             D: 12/19/2018   T: 12/21/2018   MT: LUCY       Name:     DIAMOND RESENDIZ   MRN:      [id number]        Account:        [id number]   :      1975           Procedure Date: 2018      Document: [id number]

## 2019-01-04 DIAGNOSIS — Z98.890 S/P LEFT ROTATOR CUFF REPAIR: Primary | ICD-10-CM

## 2019-01-06 NOTE — PROGRESS NOTES
"DIAGNOSIS:   1. Left shoulder rotator cuff tear, biceps disease, symptomatic AC joint arthritis, subacromial bursitis    PROCEDURES:  1. Left shoulder arthroscopic rotator cuff repair, biceps tenodesis, distal clavicle excision, subacromial decompression with bony acromioplasty.  12/19/2018.    HISTORY:  Approximately 2 weeks status post above-stated procedure.  Doing well.  Denies fever or chills, erythema or drainage from the incisions.  Pain is well controlled without medications. He states he had two \"mistakes\" in the past two weeks, one where he fell against his left shoulder while in the sling, but did not feel any pain. The second was when he was getting something out of the fridge and it slipped and he instinctively went to catch it with both arms in a forward elevation motion, as his abduction pillow was not attached, and felt pain in the anterior shoulder but his subsided in 24 hours.       EXAM:     General: Awake, Alert, and oriented. Articulates and communicates with a normal affect     Left Upper Extremity:    Incisions well healed without evidence of infection    Normal post-operative effusion and ecchymosis    Range of motion and stability exam not performed    Neurovascularly intact    ASSESSMENT:  44yo male 2 weeks s/p left shoulder arthroscopic rotator cuff repair, biceps tenodesis, distal clavicle excision, subacromial decompression with bony acromioplasty.     PLAN:   1. Begin supine gravity eliminated forward elevation 0 to unlimited and passive external rotation at the side 0 to unlimited. Continue abduction pillow due to the couple mis steps he's had in the last couple of weeks. Given PT order and will call to schedule near work at either [facility] or [facility].   2.  At the 6-week dena, he will begin gentle progressive 4-quadrant stretching.   3.  Total sling time will be 6 weeks.   4.  At 3 months, he will have unrestrictive 4-quadrant stretching, periscapular stabilization and " strengthening.  Radiographs will also be obtained at that visit.         Mariam Sarah PGY-5  Orthopedic Surgery

## 2019-01-07 ENCOUNTER — OFFICE VISIT (OUTPATIENT)
Dept: ORTHOPEDICS | Facility: CLINIC | Age: 44
End: 2019-01-07
Payer: COMMERCIAL

## 2019-01-07 DIAGNOSIS — M75.112 INCOMPLETE TEAR OF LEFT ROTATOR CUFF: Primary | ICD-10-CM

## 2019-01-07 NOTE — LETTER
Return to Work  2019     Seen today: yes    Patient:  Phu Olson  :   1975  MRN:     [id number]  Physician: ABDON GONZALEZ ORTHO NURSE    Phu Olson may return to work on Date: 2018.      The next clinic appointment is scheduled for (date/time) 2019.    Patient limitations:  Use of sling at all times and no use of the left upper extremity except for typing and computer related items.      Please call the clinic with any questions.      Electronically signed by Mariam Sarah MD
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
## 2019-01-30 DIAGNOSIS — Z98.890 S/P LEFT ROTATOR CUFF REPAIR: Primary | ICD-10-CM

## 2019-02-04 ENCOUNTER — OFFICE VISIT (OUTPATIENT)
Dept: ORTHOPEDICS | Facility: CLINIC | Age: 44
End: 2019-02-04
Payer: COMMERCIAL

## 2019-02-04 DIAGNOSIS — M75.102 TEAR OF LEFT ROTATOR CUFF, UNSPECIFIED TEAR EXTENT: Primary | ICD-10-CM

## 2019-02-04 NOTE — LETTER
2/4/2019       RE: Phu Olson  [address]  [place] [address]     Dear Colleague,    Thank you for referring your patient, Phu Olson, to the [facility] at [facility]. Please see a copy of my visit note below.    S:  Doing well. Stopped wearing the sling last week.    O:  Incision clean, dry, and intact  Sets Deltoid  Wiggles fingers  Warm and well-perfused hand with palpable pulse    A/P:  Doing well  Advance per op report      Again, thank you for allowing me to participate in the care of your patient.      Sincerely,    Kole Darby MD
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
## 2019-02-04 NOTE — PROGRESS NOTES
S:  Doing well. Stopped wearing the sling last week.    O:  Incision clean, dry, and intact  Sets Deltoid  Wiggles fingers  Warm and well-perfused hand with palpable pulse    A/P:  Doing well  Advance per op report

## 2019-02-04 NOTE — LETTER
Date:February 5, 2019      Patient was self referred, no letter generated. Do not send.        [facility] Health Information

## 2019-02-18 ENCOUNTER — TELEPHONE (OUTPATIENT)
Dept: ORTHOPEDICS | Facility: CLINIC | Age: 44
End: 2019-02-18

## 2019-02-18 NOTE — TELEPHONE ENCOUNTER
[facility] Call Center    Phone Message    May a detailed message be left on voicemail: no    Reason for Call: Other: Pt wants to speak with Dr. Darby's nurse and get an MRI or X-ray ordered to take a look at his left shoulder. Pt says it's painful when he moves and is concerned a pin has come out. Pt reports this started a week ago and is getting worse with every day. Please call Pt back to discuss.     Action Taken: Message routed to:  [facility]

## 2019-02-19 NOTE — TELEPHONE ENCOUNTER
Message left to call concerning left shoulder pain.  No pins were placed during surgery.  Dr Darby does not recommend a MRI until 6 months post-op.  Will discuss treatment recommendations during return call from patient.

## 2019-02-22 DIAGNOSIS — Z98.890 S/P LEFT ROTATOR CUFF REPAIR: Primary | ICD-10-CM

## 2019-02-25 ENCOUNTER — ANCILLARY PROCEDURE (OUTPATIENT)
Dept: GENERAL RADIOLOGY | Facility: CLINIC | Age: 44
End: 2019-02-25
Attending: ORTHOPAEDIC SURGERY
Payer: COMMERCIAL

## 2019-02-25 ENCOUNTER — OFFICE VISIT (OUTPATIENT)
Dept: ORTHOPEDICS | Facility: CLINIC | Age: 44
End: 2019-02-25
Payer: COMMERCIAL

## 2019-02-25 VITALS — WEIGHT: 177 LBS | BODY MASS INDEX: 27.78 KG/M2 | HEIGHT: 67 IN

## 2019-02-25 DIAGNOSIS — M75.102 TEAR OF LEFT ROTATOR CUFF, UNSPECIFIED TEAR EXTENT: Primary | ICD-10-CM

## 2019-02-25 DIAGNOSIS — Z98.890 S/P LEFT ROTATOR CUFF REPAIR: ICD-10-CM

## 2019-02-25 ASSESSMENT — MIFFLIN-ST. JEOR: SCORE: 1656.5

## 2019-02-25 NOTE — LETTER
Date:March 5, 2019      Patient was self referred, no letter generated. Do not send.        [facility] Health Information

## 2019-02-25 NOTE — LETTER
2019     Seen today: yes    Patient:  Phu Olson  :   1975  MRN:     [id number]  Physician: DEMETRIUS DARBY    Phu Olson may return to work on Date: 2019.  He must remain on light duty until his next clinic visit on 3/25/2019.        Please call the clinic with any questions.      Electronically signed by Demetrius Darby MD

## 2019-02-25 NOTE — LETTER
2/25/2019       RE: Phu Olson  [address]  [place] [address]     Dear Colleague,    Thank you for referring your patient, Phu Olson, to the [facility] at [facility] Please see a copy of my visit note below.    CHIEF COMPLAINT:  Left shoulder pain status post arthroscopic rotator cuff repair, subacromial decompression, biceps tenodesis, distal clavicle excision.      DATE OF SURGERY:  12/19/2018.      HISTORY OF PRESENT ILLNESS:  Mr. Olson returns today for followup.  He was doing pretty well postoperatively and then went to [place].  While he was in [place], something changed.  He was waking up 20-30 times a night per his report.  He notes it is better since he got back from [place].  He still has some catching.  He cannot abduct very much.  He notes that overall, he is concerned about the shoulder.      PHYSICAL EXAMINATION:  On exam today, he has 125 degrees of forward elevation, 50 degrees of abduction and 30 degrees of external rotation at the side.  His sensation is intact.  His axillary nerve works and he can set his anterior, middle and posterior deltoid well.      RADIOGRAPHS:  His radiographs demonstrate postsurgical changes consistent with biceps tenodesis, but no evidence of hardware failure or malalignment or an unexpected radiographic lucency.      ASSESSMENT:  Status post above procedure, doing well.      PLAN:  I had a nice talk with Mr. Olson today.  I do not think I see anything too concerning about his current exam.  He is only 2 months out from a rotator cuff repair and I would not expect him to have range of motion that was normal yet.  He is very concerned about his abduction but I did my best to reassure him that I did not think he had evidence of an implant that has pulled loose or anything of the sort.  Edin rather has dyskinesia related to the fact that he is only 2 months postoperatively from this type of  surgery.  Because of the small nature of his rotator cuff tear, I did start him on some gentle strengthening but I want him to focus on range of motion first, particularly in abduction and then we will see each other back at the 3-month dena postoperatively for reevaluation clinically.  No radiographs are necessary at that time unless he continues to struggle.         Again, thank you for allowing me to participate in the care of your patient.      Sincerely,    Kole Darby MD
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
## 2019-02-25 NOTE — PROGRESS NOTES
CHIEF COMPLAINT:  Left shoulder pain status post arthroscopic rotator cuff repair, subacromial decompression, biceps tenodesis, distal clavicle excision.      DATE OF SURGERY:  12/19/2018.      HISTORY OF PRESENT ILLNESS:  Mr. Olson returns today for followup.  He was doing pretty well postoperatively and then went to [place].  While he was in [place], something changed.  He was waking up 20-30 times a night per his report.  He notes it is better since he got back from [place].  He still has some catching.  He cannot abduct very much.  He notes that overall, he is concerned about the shoulder.      PHYSICAL EXAMINATION:  On exam today, he has 125 degrees of forward elevation, 50 degrees of abduction and 30 degrees of external rotation at the side.  His sensation is intact.  His axillary nerve works and he can set his anterior, middle and posterior deltoid well.      RADIOGRAPHS:  His radiographs demonstrate postsurgical changes consistent with biceps tenodesis, but no evidence of hardware failure or malalignment or an unexpected radiographic lucency.      ASSESSMENT:  Status post above procedure, doing well.      PLAN:  I had a nice talk with Mr. Olson today.  I do not think I see anything too concerning about his current exam.  He is only 2 months out from a rotator cuff repair and I would not expect him to have range of motion that was normal yet.  He is very concerned about his abduction but I did my best to reassure him that I did not think he had evidence of an implant that has pulled loose or anything of the sort.  Edin rather has dyskinesia related to the fact that he is only 2 months postoperatively from this type of surgery.  Because of the small nature of his rotator cuff tear, I did start him on some gentle strengthening but I want him to focus on range of motion first, particularly in abduction and then we will see each other back at the 3-month dena postoperatively for reevaluation clinically.  No  radiographs are necessary at that time unless he continues to struggle.

## 2019-03-25 ENCOUNTER — OFFICE VISIT (OUTPATIENT)
Dept: ORTHOPEDICS | Facility: CLINIC | Age: 44
End: 2019-03-25
Payer: COMMERCIAL

## 2019-03-25 VITALS — HEIGHT: 67 IN | BODY MASS INDEX: 27.78 KG/M2 | WEIGHT: 177 LBS

## 2019-03-25 DIAGNOSIS — M75.122 COMPLETE TEAR OF LEFT ROTATOR CUFF: Primary | ICD-10-CM

## 2019-03-25 ASSESSMENT — MIFFLIN-ST. JEOR: SCORE: 1656.5
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
## 2019-03-25 NOTE — LETTER
Date:March 26, 2019      Patient was self referred, no letter generated. Do not send.        [facility] Health Information

## 2019-03-25 NOTE — LETTER
"3/25/2019       RE: Phu Olson  [address]  [place] [address]     Dear Colleague,    Thank you for referring your patient, Phu Olson, to the [facility]. Please see a copy of my visit note below.    S:  Doing well. Doing \"much better than last time.\" Pain is much improved.    O:  Incision clean, dry, and intact  Sets Deltoid  Wiggles fingers  Warm and well-perfused hand with palpable pulse  125/25 AROM    A/P:  Doing well  Advance per op report      Again, thank you for allowing me to participate in the care of your patient.      Sincerely,    Kole Darby MD      "

## 2019-03-25 NOTE — PROGRESS NOTES
"S:  Doing well. Doing \"much better than last time.\" Pain is much improved.    O:  Incision clean, dry, and intact  Sets Deltoid  Wiggles fingers  Warm and well-perfused hand with palpable pulse  125/25 AROM    A/P:  Doing well  Advance per op report    "

## 2019-06-20 ENCOUNTER — TELEPHONE (OUTPATIENT)
Dept: ORTHOPEDICS | Facility: CLINIC | Age: 44
End: 2019-06-20

## 2019-06-20 NOTE — TELEPHONE ENCOUNTER
[facility] Call Center    Phone Message    May a detailed message be left on voicemail: yes    Reason for Call: Form or Letter   Type or form/letter needing completion: Workability Form, Stating light duty  Provider: Kole Darby  Date form needed: ASAP  Once completed: Fax form to: [phone]      Action Taken: Message routed to:  [facility] Ortho

## 2019-07-08 ENCOUNTER — OFFICE VISIT (OUTPATIENT)
Dept: ORTHOPEDICS | Facility: CLINIC | Age: 44
End: 2019-07-08
Payer: COMMERCIAL

## 2019-07-08 DIAGNOSIS — M75.122 COMPLETE TEAR OF LEFT ROTATOR CUFF, UNSPECIFIED WHETHER TRAUMATIC: Primary | ICD-10-CM

## 2019-07-08 NOTE — LETTER
7/8/2019       RE: Phu Olson  [address]  [place] [address]     Dear Colleague,    Thank you for referring your patient, Phu Olson, to the [facility]. Please see a copy of my visit note below.    HISTORY OF PRESENT ILLNESS:  Mr. Olson returns today for followup.  He has done very well.  He is very happy with his shoulder.  He is eager to go back to full-time duty work.      PHYSICAL EXAMINATION:  On exam today, he has 145 degrees of forward elevation, 145 degrees of lateral elevation, 40 degrees of external rotation at the side.  He has 5/5 forward elevator and external rotator strength.      ASSESSMENT:  Status post above procedure, doing well.      PLAN:  I had a nice talk with Mr. Olson today.  I think it is fine for him to progress his activities and see me back at the next anniversary of his surgery.  I have returned him to unrestricted line of duty work effective 07/20/2019.         Again, thank you for allowing me to participate in the care of your patient.      Sincerely,    Kole Darby MD

## 2019-07-08 NOTE — PROGRESS NOTES
HISTORY OF PRESENT ILLNESS:  Mr. Olson returns today for followup.  He has done very well.  He is very happy with his shoulder.  He is eager to go back to full-time duty work.      PHYSICAL EXAMINATION:  On exam today, he has 145 degrees of forward elevation, 145 degrees of lateral elevation, 40 degrees of external rotation at the side.  He has 5/5 forward elevator and external rotator strength.      ASSESSMENT:  Status post above procedure, doing well.      PLAN:  I had a nice talk with Mr. Olson today.  I think it is fine for him to progress his activities and see me back at the next anniversary of his surgery.  I have returned him to unrestricted line of duty work effective 07/20/2019.
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
## 2019-07-08 NOTE — LETTER
Return to Work  2019     Seen today: yes    Patient:  Phu Olson  :   1975  MRN:     [id number]  Physician: DEMETRIUS DARBY    Phu Olson may return to work on Date: 2019 with no restrictions.      The next clinic appointment in 6 months.      Please call the clinic with any questions.        Electronically signed by Demetrius Darby MD

## 2019-11-07 ENCOUNTER — HEALTH MAINTENANCE LETTER (OUTPATIENT)
Age: 44
End: 2019-11-07

## 2020-12-06 ENCOUNTER — HEALTH MAINTENANCE LETTER (OUTPATIENT)
Age: 45
End: 2020-12-06

## 2021-05-25 ENCOUNTER — RECORDS - HEALTHEAST (OUTPATIENT)
Dept: ADMINISTRATIVE | Facility: CLINIC | Age: 46
End: 2021-05-25

## 2021-05-30 ENCOUNTER — RECORDS - HEALTHEAST (OUTPATIENT)
Dept: ADMINISTRATIVE | Facility: CLINIC | Age: 46
End: 2021-05-30

## 2021-09-25 ENCOUNTER — HEALTH MAINTENANCE LETTER (OUTPATIENT)
Age: 46
End: 2021-09-25

## 2022-01-15 ENCOUNTER — HEALTH MAINTENANCE LETTER (OUTPATIENT)
Age: 47
End: 2022-01-15
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
## 2023-01-07 ENCOUNTER — HEALTH MAINTENANCE LETTER (OUTPATIENT)
Age: 48
End: 2023-01-07

## 2023-04-22 ENCOUNTER — HEALTH MAINTENANCE LETTER (OUTPATIENT)
Age: 48
End: 2023-04-22

## (undated) DEVICE — DRAPE U-DRAPE 1015NSD NON-STERILE

## (undated) DEVICE — GOWN XLG DISP 9545

## (undated) DEVICE — DRSG STERI STRIP 1/2X4" R1547

## (undated) DEVICE — Device

## (undated) DEVICE — BLADE SHAVER ARTHRO 4.2MM GREAT WHITE 9299A

## (undated) DEVICE — SOL HYDROGEN PEROXIDE 3% 4OZ BOTTLE F0010

## (undated) DEVICE — TUBING SYSTEM ARTHREX PUMP REDEUCE AR-6411

## (undated) DEVICE — TUBING SYSTEM ARTHREX PATIENT REDEUCE AR-6421

## (undated) DEVICE — SOL NACL 0.9% IRRIG 3000ML BAG 2B7477

## (undated) DEVICE — DRSG ADAPTIC 3X8" 6113

## (undated) DEVICE — DRSG ABDOMINAL 07 1/2X8" 7197D

## (undated) DEVICE — LINEN TOWEL PACK X5 5464

## (undated) DEVICE — ESU ELEC VAPR PREMIER RF 90DEG 3.7MM 227204

## (undated) DEVICE — TUBING SET ARTHREX DUALWAVE OUTFLOW AR-6430

## (undated) DEVICE — GLOVE PROTEXIS POWDER FREE 8.5 ORTHOPEDIC 2D73ET85

## (undated) DEVICE — NDL ARTHREX MULTIFIRE/FASTPASS SCORPION AR-13995N

## (undated) DEVICE — SYR 10ML FINGER CONTROL W/O NDL 309695

## (undated) DEVICE — DRAPE MAYO STAND 23X54 8337

## (undated) DEVICE — LIGHT HANDLE X1 31140133

## (undated) DEVICE — ARTHROSCOPIC CANNULA TWIST-IN PURPLE 7MMX7CM AR-6570

## (undated) DEVICE — RESTRAINT LIMB HOLDER ANKLE/WRIST FOAM W/QUICK RELEASE 2533

## (undated) DEVICE — BUR SHAVER ARTHRO 6MM OVAL H9102

## (undated) DEVICE — ESU GROUND PAD ADULT W/CORD E7507

## (undated) DEVICE — SU PDS II 0 CTX 36" Z370T

## (undated) DEVICE — GLOVE PROTEXIS W/NEU-THERA 8.0  2D73TE80

## (undated) DEVICE — LINEN ORTHO PACK 5446

## (undated) DEVICE — POSITIONER ARMBOARD FOAM 1PAIR LF FP-ARMB1

## (undated) DEVICE — DRAPE U-POUCH 34X29" 1067

## (undated) DEVICE — NDL SPINAL 18GA 3.5" 405184

## (undated) DEVICE — RESTRAINT LIMB HOLDER ANKLE/WRIST PEDS FOAM LF 4734

## (undated) DEVICE — SUCTION MANIFOLD DORNOCH ULTRA CART UL-CL500

## (undated) DEVICE — SOL WATER IRRIG 1000ML BOTTLE 2F7114

## (undated) DEVICE — SU MONOCRYL 3-0 PS-2 18" UND Y497G

## (undated) DEVICE — SU MONOCRYL 3-0 PS-1 27" Y936H

## (undated) DEVICE — GLOVE PROTEXIS W/NEU-THERA 8.5  2D73TE85

## (undated) DEVICE — DRSG GAUZE 4X8" NON21842

## (undated) DEVICE — TAPE MICROFOAM 4" 1528-4

## (undated) DEVICE — GLOVE PROTEXIS BLUE W/NEU-THERA 7.5  2D73EB75

## (undated) DEVICE — SOL NACL 0.9% IRRIG 1000ML BOTTLE 2F7124

## (undated) DEVICE — BRUSH SURGICAL SCRUB W/4% CHLORHEXIDINE GLUCONATE SOL 4458A

## (undated) DEVICE — STRAP KNEE/BODY 31143004

## (undated) DEVICE — IMM KIT SHOULDER STABILIZATION 7210573

## (undated) DEVICE — NDL 18GA 1.5" 305196

## (undated) DEVICE — IMM KIT SHOULDER TMAX MASK FACE 7210559

## (undated) RX ORDER — ONDANSETRON 2 MG/ML
INJECTION INTRAMUSCULAR; INTRAVENOUS
Status: DISPENSED
Start: 2018-12-19

## (undated) RX ORDER — FENTANYL CITRATE 50 UG/ML
INJECTION, SOLUTION INTRAMUSCULAR; INTRAVENOUS
Status: DISPENSED
Start: 2018-12-19

## (undated) RX ORDER — ACETAMINOPHEN 325 MG/1
TABLET ORAL
Status: DISPENSED
Start: 2018-12-19

## (undated) RX ORDER — PHENYLEPHRINE HCL IN 0.9% NACL 1 MG/10 ML
SYRINGE (ML) INTRAVENOUS
Status: DISPENSED
Start: 2018-12-19

## (undated) RX ORDER — PROPOFOL 10 MG/ML
INJECTION, EMULSION INTRAVENOUS
Status: DISPENSED
Start: 2018-12-19

## (undated) RX ORDER — CEFAZOLIN SODIUM 2 G/100ML
INJECTION, SOLUTION INTRAVENOUS
Status: DISPENSED
Start: 2018-12-19

## (undated) RX ORDER — KETAMINE HCL IN 0.9 % NACL 50 MG/5 ML
SYRINGE (ML) INTRAVENOUS
Status: DISPENSED
Start: 2018-12-19

## (undated) RX ORDER — DEXAMETHASONE SODIUM PHOSPHATE 4 MG/ML
INJECTION, SOLUTION INTRA-ARTICULAR; INTRALESIONAL; INTRAMUSCULAR; INTRAVENOUS; SOFT TISSUE
Status: DISPENSED
Start: 2018-12-19